# Patient Record
Sex: FEMALE | Race: WHITE | NOT HISPANIC OR LATINO | Employment: UNEMPLOYED | URBAN - METROPOLITAN AREA
[De-identification: names, ages, dates, MRNs, and addresses within clinical notes are randomized per-mention and may not be internally consistent; named-entity substitution may affect disease eponyms.]

---
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
## 2024-10-05 ENCOUNTER — TRANSFERRED RECORDS (OUTPATIENT)
Dept: HEALTH INFORMATION MANAGEMENT | Facility: CLINIC | Age: 19
End: 2024-10-05
Payer: COMMERCIAL

## 2024-10-08 ENCOUNTER — OFFICE VISIT (OUTPATIENT)
Dept: PEDIATRICS | Facility: CLINIC | Age: 19
End: 2024-10-08
Payer: COMMERCIAL

## 2024-10-08 ENCOUNTER — PATIENT OUTREACH (OUTPATIENT)
Dept: CARE COORDINATION | Facility: CLINIC | Age: 19
End: 2024-10-08

## 2024-10-08 VITALS
BODY MASS INDEX: 26.63 KG/M2 | WEIGHT: 156 LBS | OXYGEN SATURATION: 98 % | DIASTOLIC BLOOD PRESSURE: 66 MMHG | TEMPERATURE: 98.6 F | HEIGHT: 64 IN | SYSTOLIC BLOOD PRESSURE: 119 MMHG | RESPIRATION RATE: 16 BRPM | HEART RATE: 72 BPM
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
## 2024-10-08 DIAGNOSIS — Z13.9 ENCOUNTER FOR SCREENING INVOLVING SOCIAL DETERMINANTS OF HEALTH (SDOH): ICD-10-CM

## 2024-10-08 PROCEDURE — 99204 OFFICE O/P NEW MOD 45 MIN: CPT | Performed by: NURSE PRACTITIONER

## 2024-10-08 PROCEDURE — G2211 COMPLEX E/M VISIT ADD ON: HCPCS | Performed by: NURSE PRACTITIONER

## 2024-10-08 RX ORDER — OXYCODONE HYDROCHLORIDE 5 MG/1
5-10 TABLET ORAL EVERY 6 HOURS PRN
Qty: 18 TABLET | Refills: 0 | Status: SHIPPED | OUTPATIENT
Start: 2024-10-08 | End: 2024-10-11

## 2024-10-08 RX ORDER — ONDANSETRON 4 MG/1
4 TABLET, ORALLY DISINTEGRATING ORAL EVERY 8 HOURS PRN
Qty: 20 TABLET | Refills: 3 | Status: SHIPPED | OUTPATIENT
Start: 2024-10-08

## 2024-10-08 RX ORDER — POLYETHYLENE GLYCOL 3350 17 G/17G
1 POWDER, FOR SOLUTION ORAL DAILY
Qty: 510 G | Refills: 0 | Status: SHIPPED | OUTPATIENT
Start: 2024-10-08

## 2024-10-08 ASSESSMENT — PAIN SCALES - GENERAL: PAINLEVEL: WORST PAIN (10)
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
## 2024-10-08 NOTE — LETTER
[facility] CARE COORDINATION      October 8, 2024    Christina Quintana  [address]  [place] [place] [facility] [address]      Dear Christina,    I am a clinic care coordinator who works with [facility] with [facility]. I wanted to thank you for spending the time to talk with me.  Below is a description of clinic care coordination and how I can further assist you.       The clinic care coordination team is made up of a registered nurse, , financial resource worker and community health worker who understand the health care system. The goal of clinic care coordination is to help you manage your health and improve access to the health care system. Our team works alongside your provider to assist you in determining your health and social needs. We can help you obtain health care and community resources, providing you with necessary information and education. We can work with you through any barriers and develop a care plan that helps coordinate and strengthen the communication between you and your care team.  Our services are voluntary and are offered without charge to you personally.    Please feel free to contact me with any questions or concerns regarding care coordination and what we can offer.      We are focused on providing you with the highest-quality healthcare experience possible.    Sincerely,     Daryl Mendoza [facility]  Clinic Care Coordinator  [facility]  [phone]  [email or web address]                  Resources Discussed:     Domestic Violence Resources  Day One Crisis  Crisis Hotline:1.097.597.0219  Crisis TEXT line: 567.765.5035  Http://dayoneservices.org/      [facility]  Business Line: [phone]  Business Line: [phone]  Business Line: (587) [phone]/TTY  Crisis Line: (114) 923-7023  www.360communities.org       [place]  "Neighborhood Services  Business Line: [phone]  www.Bioincept.org      [place]  Business Line: [phone]  Crisis Line: (220) 986-8223  www.[employer].org      Women's Advocates  Business Line: [phone]) [phone]  Crisis Line: ([phone]  www.[employer].MyPar[employer]ay       Women of Nations  Business Line: ([phone]) [phone]  Crisis Line: (815) 829-8247  women-of-nations.org        Financial Resources:  -[employer]: \"You must email your request to this address: [email or web address]   If you do not have access to email, you can text us at  [phone]. Do not mail requests.  Under no circumstances should you call in to request assistance. We will only except email requests or texts. This is for your safety, so you can see all documentation concerning your request.  You must be a resident of [place].\"  https://www.ume07-uytqtgilkxbofm.org/    -[employer]: Offers one-time assistance for specific needs, typically smaller amounts. May help with a security deposit. https://popmn.org/mission/mission-outpost/    -[address] [place] [address]:  [facility] Resource [address], [address], [place] [address]  Phone: (220) [phone]  Https://We Tribute.org/contact/  https://stage2.[address] [place] [address].org/resources/family-resource-centers/    -[facility] Emergency Assistance: \"Emergency assistance programs are short-term assistance (usually a one-time payment) for people experiencing a financial hardship such as an eviction or utility shutoff. Any income not needed for basic needs will be considered available to meet the emergency as well as any cash or other assets you might have.\"  Https://www.co.[place]./HealthFamily/PublicAssistance/Emergency/Pages/default.aspx      Therapy Options: (request someone who specializes in trauma)  [facility]   [phone]   https://[facility].[employer]gongfang/  Provider Bios: https://[employer]/locations/[facility]/    [facility]: [place]  [phone]  "   https://www.[facility].com/  Provider Bios: https://www.[facility]nergys.com/martín-staff    [facility]: [place]-  [phone]   https://[facility].com/  Provider Bios: https://[employer].com/providers/    [facility]-  [phone]  https://www.[facility].com/  Provider Bios: https://www."[employer].com/meet-our-team/fgussgxki-ji-ecvhijvaak     [place] [facility] [place]-   [phone]  https://[facility].com/  Provider Bios: https://[facility].com/our-professionals/     [facility]: [place]-  [phone]  https://www.What's Trending.com/our-locations/[place]/[facility]-cl[facility]/  Provider Bios: https://www.[employer]ing.com/our-providers/     [facility]:-[place]-  [phone]  https://[facility].com/  Provider Bios: https://[facility]Smn.com/[facility]/

## 2024-10-08 NOTE — PATIENT INSTRUCTIONS
Tylenol: Maximum 3000mg in a day. Take tylenol 3x per day (1000mg)    Ibuprofen: 400-800mg. Take with food
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
## 2024-10-10 ENCOUNTER — OFFICE VISIT (OUTPATIENT)
Dept: PEDIATRICS | Facility: CLINIC | Age: 19
End: 2024-10-10
Payer: COMMERCIAL

## 2024-10-10 ENCOUNTER — TELEPHONE (OUTPATIENT)
Dept: PEDIATRICS | Facility: CLINIC | Age: 19
End: 2024-10-10

## 2024-10-10 VITALS
TEMPERATURE: 99.3 F | WEIGHT: 153.2 LBS | HEART RATE: 105 BPM | HEIGHT: 64 IN | DIASTOLIC BLOOD PRESSURE: 68 MMHG | RESPIRATION RATE: 18 BRPM | BODY MASS INDEX: 26.15 KG/M2 | OXYGEN SATURATION: 98 % | SYSTOLIC BLOOD PRESSURE: 114 MMHG

## 2024-10-10 DIAGNOSIS — R79.89 ABNORMAL CBC: ICD-10-CM
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
## 2024-10-10 LAB
BASOPHILS # BLD AUTO: 0 10E3/UL (ref 0–0.2)
BASOPHILS NFR BLD AUTO: 0 %
EOSINOPHIL # BLD AUTO: 0.2 10E3/UL (ref 0–0.7)
EOSINOPHIL NFR BLD AUTO: 2 %
ERYTHROCYTE [DISTWIDTH] IN BLOOD BY AUTOMATED COUNT: 13.6 % (ref 10–15)
HCT VFR BLD AUTO: 39.8 % (ref 35–47)
HGB BLD-MCNC: 13.1 G/DL (ref 11.7–15.7)
IMM GRANULOCYTES # BLD: 0 10E3/UL
IMM GRANULOCYTES NFR BLD: 0 %
LYMPHOCYTES # BLD AUTO: 2 10E3/UL (ref 0.8–5.3)
LYMPHOCYTES NFR BLD AUTO: 29 %
MCH RBC QN AUTO: 29.9 PG (ref 26.5–33)
MCHC RBC AUTO-ENTMCNC: 32.9 G/DL (ref 31.5–36.5)
MCV RBC AUTO: 91 FL (ref 78–100)
MONOCYTES # BLD AUTO: 0.5 10E3/UL (ref 0–1.3)
MONOCYTES NFR BLD AUTO: 7 %
NEUTROPHILS # BLD AUTO: 4.2 10E3/UL (ref 1.6–8.3)
NEUTROPHILS NFR BLD AUTO: 61 %
PLATELET # BLD AUTO: 222 10E3/UL (ref 150–450)
RBC # BLD AUTO: 4.38 10E6/UL (ref 3.8–5.2)
WBC # BLD AUTO: 6.8 10E3/UL (ref 4–11)

## 2024-10-10 PROCEDURE — G2211 COMPLEX E/M VISIT ADD ON: HCPCS | Performed by: NURSE PRACTITIONER

## 2024-10-10 PROCEDURE — 99204 OFFICE O/P NEW MOD 45 MIN: CPT | Performed by: NURSE PRACTITIONER

## 2024-10-10 PROCEDURE — 36415 COLL VENOUS BLD VENIPUNCTURE: CPT | Performed by: NURSE PRACTITIONER

## 2024-10-10 PROCEDURE — 85025 COMPLETE CBC W/AUTO DIFF WBC: CPT | Performed by: NURSE PRACTITIONER

## 2024-10-10 ASSESSMENT — PAIN SCALES - GENERAL: PAINLEVEL: SEVERE PAIN (6)
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
## 2024-10-10 NOTE — TELEPHONE ENCOUNTER
Call pt  Is she interested in seeing OBGYN to discuss birth control options like IUD? If so, I can put in a referral.  Kasandra Goodman, RALEIGH, CNP

## 2024-10-10 NOTE — PATIENT INSTRUCTIONS
Keep up with the daily dressing changes  We will recheck labs today  Referral to wound clinic: you will get a call to schedule

## 2024-10-10 NOTE — TELEPHONE ENCOUNTER
Called and spoke to patient. Advised of provider message. Patient declined referral at this time.  Janet EWING RN, BSN  Clinic RN  [facility]

## 2024-10-10 NOTE — LETTER
October 11, 2024      Christina Quintana  [address] [place] [facility] [address]        Dear ,    We are writing to inform you of your test results.    Your test results fall within the expected range(s) or remain unchanged from previous results.  Please continue with current treatment plan.    Resulted Orders   CBC with platelets and differential   Result Value Ref Range    WBC Count 6.8 4.0 - 11.0 10e3/uL    RBC Count 4.38 3.80 - 5.20 10e6/uL    Hemoglobin 13.1 11.7 - 15.7 g/dL    Hematocrit 39.8 35.0 - 47.0 %    MCV 91 78 - 100 fL    MCH 29.9 26.5 - 33.0 pg    MCHC 32.9 31.5 - 36.5 g/dL    RDW 13.6 10.0 - 15.0 %    Platelet Count 222 150 - 450 10e3/uL    % Neutrophils 61 %    % Lymphocytes 29 %    % Monocytes 7 %    % Eosinophils 2 %    % Basophils 0 %    % Immature Granulocytes 0 %    Absolute Neutrophils 4.2 1.6 - 8.3 10e3/uL    Absolute Lymphocytes 2.0 0.8 - 5.3 10e3/uL    Absolute Monocytes 0.5 0.0 - 1.3 10e3/uL    Absolute Eosinophils 0.2 0.0 - 0.7 10e3/uL    Absolute Basophils 0.0 0.0 - 0.2 10e3/uL    Absolute Immature Granulocytes 0.0 <=0.4 10e3/uL       If you have any questions or concerns, please call the clinic at the number listed above.       Sincerely,      Kasandra Goodman NP

## 2024-10-11 ENCOUNTER — PATIENT OUTREACH (OUTPATIENT)
Dept: CARE COORDINATION | Facility: CLINIC | Age: 19
End: 2024-10-11
Payer: COMMERCIAL

## 2024-10-11 NOTE — PROGRESS NOTES
Clinic Care Coordination Contact  [facility]/Voicemail    Clinical Data: Care Coordinator Outreach    Outreach Documentation Number of Outreach Attempt   10/11/2024  10:35 AM 1       Left message on patient's voicemail with call back information and requested return call.    Plan: Care Coordinator will try to reach patient again in 3-5 business days.    Daryl Mendoza [facility]  Clinic Care Coordinator  [facility]  [phone]  [email or web address]

## 2024-10-14 ENCOUNTER — TELEPHONE (OUTPATIENT)
Dept: WOUND CARE | Facility: CLINIC | Age: 19
End: 2024-10-14
Payer: COMMERCIAL

## 2024-10-14 NOTE — TELEPHONE ENCOUNTER
Consult received via Workqueue from Kasandra Goodman NP for wound of the Left Leg    Does the patient have an open and draining wound? Yes    Please schedule with Pauly Carter PA-C, Kitty Lunsford NP, or Sheldon Hay M.D. at [facility] [place] for next available appointment.    **For all providers, please schedule a follow up 4 weeks after initial appointment. (2-3 weeks for Dr. Sethi and Dr. Nixon)**  --If unable to schedule within 2-3 weeks then please place on cancellation list--    Is the patient able to make their own medical decisions? Yes    Can the patient be scheduled on a Thursday ([place] (starting in November))? Yes    Is patient a CHRISTIANO lift? PLEASE INQUIRE WHEN MAKING THE APPOINTMENT AND PUT IN APPOINTMENT NOTES    Routing to  Wound Healing Scheduling.

## 2024-10-14 NOTE — TELEPHONE ENCOUNTER
Patient called would like to be scheduled to be seen for a left inner thigh wound, states there is a hole in the back and front of her thigh.    Christina [phone]

## 2024-10-16 ENCOUNTER — PATIENT OUTREACH (OUTPATIENT)
Dept: CARE COORDINATION | Facility: CLINIC | Age: 19
End: 2024-10-16
Payer: COMMERCIAL
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
## 2024-10-17 ENCOUNTER — HOSPITAL ENCOUNTER (OUTPATIENT)
Dept: WOUND CARE | Facility: CLINIC | Age: 19
Discharge: HOME | End: 2024-10-17
Payer: COMMERCIAL

## 2024-10-17 VITALS
HEIGHT: 64 IN | WEIGHT: 154.8 LBS | BODY MASS INDEX: 26.43 KG/M2 | HEART RATE: 69 BPM | DIASTOLIC BLOOD PRESSURE: 77 MMHG | SYSTOLIC BLOOD PRESSURE: 118 MMHG | TEMPERATURE: 96.4 F

## 2024-10-17 DIAGNOSIS — L97.122: Primary | ICD-10-CM

## 2024-10-17 PROCEDURE — 97602 WOUND(S) CARE NON-SELECTIVE: CPT

## 2024-10-17 PROCEDURE — 99204 OFFICE O/P NEW MOD 45 MIN: CPT

## 2024-10-17 PROCEDURE — G0463 HOSPITAL OUTPT CLINIC VISIT: HCPCS | Mod: 25

## 2024-10-17 NOTE — DISCHARGE INSTRUCTIONS
"10/17/2024   Christina Quintana   2005    A DME order for supplies has been placed to [employer]. If there are any issues with your order including not receiving the order please call [employer] at [phone]. They can also provide a tracking number for you.    Dressing changes outside of clinic are being performed by Patient    Plan 10/17/2024   -You may have a seroma or hematoma near the wound and that is why the tissue feels firm. This will resolve over time and will reabsorb into your tissue.   -Okay to shower when you are ready. Take off the dressing. Let the water run over the wound. Pat dry. And apply the dressings listed below.     Wound Dressing Change: Left medial thigh   -Wash your hands with soap and water before you begin your dressing change and prepare a clean surface for dressings.  -Cleanse with mild unscented soap and water (such as Cetaphil, Cerave or Dove)   -Primary dressing: Apply a very thin layer of Triad paste to the wound bed. If the wound bed is red and healthy with no yellow tissue you can omit this step.   -Secondary dressing: Apply 1/15th piece of 4x4 Fibrocol over the triad paste  -Cover with 2 4x4 gauze pad   -secure with 1 4\" conforming roll gauze and medipore tape  -Apply 6\" Ace Wrap  -Change daily     Wound Dressing Change: left posterior thigh  -Wash your hands with soap and water before you begin your dressing change and prepare a clean surface for dressings.  -Cleanse with mild unscented soap and water (such as Cetaphil, Cerave or Dove)   -Primary dressing: Apply 1/15th piece of 4x4 Fibrocol to the wound bed. (Fibrocol may dissolve into the wound bed so you may not see it at your next dressing change)  -Secondary dressing: Apply 2 4x4 gauze pad  -secure with 1 4\" conforming roll gauze and medipore tape  -Apply 6\" Ace Wrap   -Change daily     A diet high in protein is important for wound healing, we recommend getting 90 grams of protein per day. Taking protein shakes or " bars are a good way to get extra protein in your diet.   Good sources of protein:  Pork 26g per 3 oz  Whey protein powder - 24g per scoop (on average)  Greek yogurt - 23g per 8oz   Chicken or Turkey - 23g per 3oz  Fish - 20-25g per 3oz  Beef - 18-23g per 3oz  Tofu - 10g per 1/2 cup  Navy beans - 20g per cup  Cottage cheese - 14g per 1/2 cup   Lentils - 13g per 1/4 cup  Beef jerky 13g per 1oz  2% milk - 8g per cup  Peanut butter - 8g per 2 tablespoons  Eggs - 6g per egg  Mixed nuts - 6g per 2oz       Main Provider: Kitty Lunsford NP October 17, 2024    Call us at [phone] if you have any questions about your wounds, if you have redness or swelling around your wound, have a fever of 101 degrees Fahrenheit or greater or if you have any other problems or concerns. We answer the phone Monday through Friday 8 am to 4 pm, please leave a message as we check the voicemail frequently throughout the day. If you have a concern over the weekend, please leave a message and we will return your call Monday. If the need is urgent, go to the ER or urgent care.    If you had a positive experience please indicate that on your patient satisfaction survey form that [facility] will be sending you.    It was a pleasure meeting with you today.  Thank you for allowing me and my team the privilege of caring for you today.  YOU are the reason we are here, and I truly hope we provided you with the excellent service you deserve.  Please let us know if there is anything else we can do for you so that we can be sure you are leaving completely satisfied with your care experience.      If you have any billing related questions please call the [facility] Business office at [phone]. The clinic staff does not handle billing related matters.    If you are scheduled to have a follow up appointment, you will receive a reminder call the day before your visit. On the appointment day please arrive 15 minutes prior to your appointment time. If  you are unable to keep that appointment, please call the clinic to cancel or reschedule. If you are more than 10 minutes late or greater for your scheduled appointment time, the clinic policy is that you may be asked to reschedule.

## 2024-10-17 NOTE — PROGRESS NOTES
Patient arrived for wound care visit. Certified Wound Care Nurse time spent evaluating patient record, completed a full evaluation and documented wound(s) & amy-wound skin; provided recommendation based on treatment plan. Applied dressing, reviewed discharge instructions, patient education, and discussed plan of care with appropriate medical team staff members and patient and/or family members.
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
## 2024-10-22 ENCOUNTER — HOSPITAL ENCOUNTER (EMERGENCY)
Facility: CLINIC | Age: 19
Discharge: LEFT BEFORE DISCHARGE | End: 2024-10-22
Admitting: EMERGENCY MEDICINE
Payer: COMMERCIAL

## 2024-10-22 VITALS
SYSTOLIC BLOOD PRESSURE: 137 MMHG | DIASTOLIC BLOOD PRESSURE: 81 MMHG | RESPIRATION RATE: 18 BRPM | TEMPERATURE: 98.3 F | OXYGEN SATURATION: 98 % | HEIGHT: 63 IN | HEART RATE: 78 BPM | BODY MASS INDEX: 27.46 KG/M2 | WEIGHT: 155 LBS

## 2024-10-22 PROCEDURE — 99281 EMR DPT VST MAYX REQ PHY/QHP: CPT

## 2024-10-22 RX ORDER — OXYCODONE HYDROCHLORIDE 5 MG/1
5 CAPSULE ORAL EVERY 4 HOURS PRN
COMMUNITY

## 2024-10-22 ASSESSMENT — ACTIVITIES OF DAILY LIVING (ADL)
ADLS_ACUITY_SCORE: 35
ADLS_ACUITY_SCORE: 35
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
## 2024-10-23 NOTE — ED NOTES
Patient upset with wait time & not willing to wait to be seen.  Left per pedis with female person.  Gait steady.

## 2025-01-12 ENCOUNTER — HOSPITAL ENCOUNTER (EMERGENCY)
Facility: CLINIC | Age: 20
Discharge: HOME | End: 2025-01-12
Attending: EMERGENCY MEDICINE | Admitting: EMERGENCY MEDICINE
Payer: COMMERCIAL

## 2025-01-12 VITALS
WEIGHT: 154.98 LBS | BODY MASS INDEX: 27.45 KG/M2 | OXYGEN SATURATION: 100 % | SYSTOLIC BLOOD PRESSURE: 132 MMHG | TEMPERATURE: 97.5 F | HEART RATE: 83 BPM | RESPIRATION RATE: 16 BRPM | DIASTOLIC BLOOD PRESSURE: 77 MMHG

## 2025-01-12 DIAGNOSIS — N12 PYELONEPHRITIS OF RIGHT KIDNEY: ICD-10-CM

## 2025-01-12 LAB
ALBUMIN UR-MCNC: 10 MG/DL
ANION GAP SERPL CALCULATED.3IONS-SCNC: 15 MMOL/L (ref 7–15)
APPEARANCE UR: ABNORMAL
BASOPHILS # BLD AUTO: 0 10E3/UL (ref 0–0.2)
BASOPHILS NFR BLD AUTO: 0 %
BILIRUB UR QL STRIP: NEGATIVE
BUN SERPL-MCNC: 7.8 MG/DL (ref 6–20)
CALCIUM SERPL-MCNC: 9.2 MG/DL (ref 8.8–10.4)
CHLORIDE SERPL-SCNC: 102 MMOL/L (ref 98–107)
COLOR UR AUTO: YELLOW
CREAT SERPL-MCNC: 0.67 MG/DL (ref 0.51–0.95)
EGFRCR SERPLBLD CKD-EPI 2021: >90 ML/MIN/1.73M2
EOSINOPHIL # BLD AUTO: 0 10E3/UL (ref 0–0.7)
EOSINOPHIL NFR BLD AUTO: 0 %
ERYTHROCYTE [DISTWIDTH] IN BLOOD BY AUTOMATED COUNT: 13.1 % (ref 10–15)
GLUCOSE SERPL-MCNC: 82 MG/DL (ref 70–99)
GLUCOSE UR STRIP-MCNC: NEGATIVE MG/DL
HCO3 SERPL-SCNC: 21 MMOL/L (ref 22–29)
HCT VFR BLD AUTO: 38.2 % (ref 35–47)
HGB BLD-MCNC: 13.3 G/DL (ref 11.7–15.7)
HGB UR QL STRIP: NEGATIVE
HOLD SPECIMEN: NORMAL
HOLD SPECIMEN: NORMAL
IMM GRANULOCYTES # BLD: 0.1 10E3/UL
IMM GRANULOCYTES NFR BLD: 1 %
KETONES UR STRIP-MCNC: NEGATIVE MG/DL
LEUKOCYTE ESTERASE UR QL STRIP: ABNORMAL
LYMPHOCYTES # BLD AUTO: 3.6 10E3/UL (ref 0.8–5.3)
LYMPHOCYTES NFR BLD AUTO: 19 %
MCH RBC QN AUTO: 29.7 PG (ref 26.5–33)
MCHC RBC AUTO-ENTMCNC: 34.8 G/DL (ref 31.5–36.5)
MCV RBC AUTO: 85 FL (ref 78–100)
MONOCYTES # BLD AUTO: 1.7 10E3/UL (ref 0–1.3)
MONOCYTES NFR BLD AUTO: 9 %
MUCOUS THREADS #/AREA URNS LPF: PRESENT /LPF
NEUTROPHILS # BLD AUTO: 13.9 10E3/UL (ref 1.6–8.3)
NEUTROPHILS NFR BLD AUTO: 72 %
NITRATE UR QL: NEGATIVE
NRBC # BLD AUTO: 0 10E3/UL
NRBC BLD AUTO-RTO: 0 /100
PH UR STRIP: 6 [PH] (ref 5–7)
PLAT MORPH BLD: NORMAL
PLATELET # BLD AUTO: 276 10E3/UL (ref 150–450)
POTASSIUM SERPL-SCNC: 3.8 MMOL/L (ref 3.4–5.3)
RBC # BLD AUTO: 4.48 10E6/UL (ref 3.8–5.2)
RBC MORPH BLD: NORMAL
RBC URINE: 5 /HPF
SODIUM SERPL-SCNC: 138 MMOL/L (ref 135–145)
SP GR UR STRIP: 1.02 (ref 1–1.03)
SQUAMOUS EPITHELIAL: 11 /HPF
UROBILINOGEN UR STRIP-MCNC: 4 MG/DL
WBC # BLD AUTO: 19.4 10E3/UL (ref 4–11)
WBC URINE: 7 /HPF

## 2025-01-12 PROCEDURE — 96375 TX/PRO/DX INJ NEW DRUG ADDON: CPT

## 2025-01-12 PROCEDURE — 85025 COMPLETE CBC W/AUTO DIFF WBC: CPT | Performed by: EMERGENCY MEDICINE

## 2025-01-12 PROCEDURE — 81001 URINALYSIS AUTO W/SCOPE: CPT | Performed by: EMERGENCY MEDICINE

## 2025-01-12 PROCEDURE — 80048 BASIC METABOLIC PNL TOTAL CA: CPT | Performed by: EMERGENCY MEDICINE

## 2025-01-12 PROCEDURE — 85014 HEMATOCRIT: CPT | Performed by: EMERGENCY MEDICINE

## 2025-01-12 PROCEDURE — 36415 COLL VENOUS BLD VENIPUNCTURE: CPT | Performed by: EMERGENCY MEDICINE

## 2025-01-12 PROCEDURE — 87040 BLOOD CULTURE FOR BACTERIA: CPT | Performed by: EMERGENCY MEDICINE

## 2025-01-12 PROCEDURE — 99284 EMERGENCY DEPT VISIT MOD MDM: CPT | Mod: 25

## 2025-01-12 PROCEDURE — 250N000011 HC RX IP 250 OP 636: Performed by: EMERGENCY MEDICINE

## 2025-01-12 PROCEDURE — 96374 THER/PROPH/DIAG INJ IV PUSH: CPT

## 2025-01-12 RX ORDER — CEFTRIAXONE 2 G/1
2 INJECTION, POWDER, FOR SOLUTION INTRAMUSCULAR; INTRAVENOUS ONCE
Status: COMPLETED | OUTPATIENT
Start: 2025-01-12 | End: 2025-01-12

## 2025-01-12 RX ORDER — CEFPODOXIME PROXETIL 200 MG/1
200 TABLET, FILM COATED ORAL 2 TIMES DAILY
Qty: 20 TABLET | Refills: 0 | Status: SHIPPED | OUTPATIENT
Start: 2025-01-12 | End: 2025-01-22

## 2025-01-12 RX ORDER — KETOROLAC TROMETHAMINE 15 MG/ML
15 INJECTION, SOLUTION INTRAMUSCULAR; INTRAVENOUS ONCE
Status: COMPLETED | OUTPATIENT
Start: 2025-01-12 | End: 2025-01-12

## 2025-01-12 RX ADMIN — CEFTRIAXONE 2 G: 2 INJECTION, POWDER, FOR SOLUTION INTRAMUSCULAR; INTRAVENOUS at 18:46

## 2025-01-12 RX ADMIN — KETOROLAC TROMETHAMINE 15 MG: 15 INJECTION, SOLUTION INTRAMUSCULAR; INTRAVENOUS at 18:40

## 2025-01-12 ASSESSMENT — COLUMBIA-SUICIDE SEVERITY RATING SCALE - C-SSRS
6. HAVE YOU EVER DONE ANYTHING, STARTED TO DO ANYTHING, OR PREPARED TO DO ANYTHING TO END YOUR LIFE?: NO
2. HAVE YOU ACTUALLY HAD ANY THOUGHTS OF KILLING YOURSELF IN THE PAST MONTH?: NO
1. IN THE PAST MONTH, HAVE YOU WISHED YOU WERE DEAD OR WISHED YOU COULD GO TO SLEEP AND NOT WAKE UP?: NO

## 2025-01-12 ASSESSMENT — ACTIVITIES OF DAILY LIVING (ADL)
ADLS_ACUITY_SCORE: 42
ADLS_ACUITY_SCORE: 42

## 2025-01-12 NOTE — ED TRIAGE NOTES
Right sided flank pain that started 3 days ago. Hx of pyelonephritis, feels similar to that. Denies pain with urination.

## 2025-01-13 NOTE — ED PROVIDER NOTES
Emergency Department Note      History of Present Illness     Chief Complaint   Flank Pain      HPI   Christina Quintana is a 19 year old female with a past medical history significant for several previous episodes of pyelonephritis, asthma, and obesity who presents to the emergency department for evaluation of right sided flank pain. She reports that starting about 2-3 days ago, she had the sudden onset of right lower back and flank pain. However, when the pain first came on, she thought that she had maybe slept wrong and it felt very musculoskeletal in nature. However, the following day she felt this pain starting to radiate to her right ribcage. She also endorses subjective fever, chills, and body aches at this time. She did have pyelonephritis about a year ago, and she feels that her symptoms at this time are consistent with what she was experiencing when she had this. She was started on antibiotics following this procedure, but is not sure the name of the medication. She was not admitted to the hospital during this event. She had pyelonephritis about 4.5 years ago as well and was admitted to [facility]. She denies any history of nephrolithiasis. She denies any urinary symptoms at this time, nausea, or episodes of emesis. She is afebrile at bedside with a temperature of 97.5 F. She does note that she often applies scented lotion to her genital area and believes this may be causing her recurrent pyelonephritis episodes. She took ibuprofen at about 1100 am this morning to manage the pain, without significant relief.    Independent Historian   None    Review of External Notes   I reviewed the ED to admission note from 9/8/20, for which the patient was admitted for pyelonephritis.    Past Medical History   Medical History and Problem List   Pyelonephritis  Obesity  Ulcer of left thigh  Asthma  chlamydia    Medications   Hydrocodone-acetaminophen  ondansetron  oxycodone   polyethylene glycol  Albuterol  Pulmicort  nebulizer    Physical Exam     Patient Vitals for the past 24 hrs:   BP Temp Temp src Pulse Resp SpO2 Weight   01/12/25 1721 132/77 -- -- -- -- -- --   01/12/25 1719 -- 97.5  F (36.4  C) Temporal 83 16 100 % 70.3 kg (154 lb 15.7 oz)     Physical Exam  Nursing note and vitals reviewed.  HENT:   Mouth/Throat: Moist mucous membranes.   Eyes: EOMI, nonicteric sclera  Cardiovascular: Normal rate, regular rhythm, no murmurs, rubs, or gallops  Pulmonary/Chest: Effort normal and breath sounds normal. No respiratory distress. No wheezes. No rales.   Abdominal: Soft. Nontender, nondistended, no guarding or rigidity.  Right-sided CVA tenderness.  Musculoskeletal: Normal range of motion.   Neurological: Alert. Moves all extremities spontaneously.   Skin: Skin is warm and dry. No rash noted.         Diagnostics     Lab Results   Labs Ordered and Resulted from Time of ED Arrival to Time of ED Departure   ROUTINE UA WITH MICROSCOPIC REFLEX TO CULTURE - Abnormal       Result Value    Color Urine Yellow      Appearance Urine Slightly Cloudy (*)     Glucose Urine Negative      Bilirubin Urine Negative      Ketones Urine Negative      Specific Gravity Urine 1.024      Blood Urine Negative      pH Urine 6.0      Protein Albumin Urine 10 (*)     Urobilinogen Urine 4.0 (*)     Nitrite Urine Negative      Leukocyte Esterase Urine Trace (*)     Mucus Urine Present (*)     RBC Urine 5 (*)     WBC Urine 7 (*)     Squamous Epithelials Urine 11 (*)    BASIC METABOLIC PANEL - Abnormal    Sodium 138      Potassium 3.8      Chloride 102      Carbon Dioxide (CO2) 21 (*)     Anion Gap 15      Urea Nitrogen 7.8      Creatinine 0.67      GFR Estimate >90      Calcium 9.2      Glucose 82     CBC WITH PLATELETS AND DIFFERENTIAL - Abnormal    WBC Count 19.4 (*)     RBC Count 4.48      Hemoglobin 13.3      Hematocrit 38.2      MCV 85      MCH 29.7      MCHC 34.8      RDW 13.1      Platelet Count 276      % Neutrophils 72      % Lymphocytes 19      %  Monocytes 9      % Eosinophils 0      % Basophils 0      % Immature Granulocytes 1      NRBCs per 100 WBC 0      Absolute Neutrophils 13.9 (*)     Absolute Lymphocytes 3.6      Absolute Monocytes 1.7 (*)     Absolute Eosinophils 0.0      Absolute Basophils 0.0      Absolute Immature Granulocytes 0.1      Absolute NRBCs 0.0     RBC AND PLATELET MORPHOLOGY    RBC Morphology Confirmed RBC Indices      Platelet Assessment        Value: Automated Count Confirmed. Platelet morphology is normal.   BLOOD CULTURE       Imaging   No orders to display       EKG   None    Independent Interpretation   None    ED Course      Medications Administered   Medications   cefTRIAXone (ROCEPHIN) 2 g vial to attach to  ml bag for ADULTS or NS 50 ml bag for PEDS (0 g Intravenous Stopped 1/12/25 1900)   ketorolac (TORADOL) injection 15 mg (15 mg Intravenous $Given 1/12/25 1840)       Procedures   Procedures     Discussion of Management   None    ED Course   ED Course as of 01/12/25 1909   Sun Jan 12, 2025 1821 I obtained history and examined the patient as noted above.         Additional Documentation  None    Medical Decision Making / Diagnosis     CMS Diagnoses:none    MIPS       None    [facility]   Christina Quintana is a 19 year old female who presents with right-sided flank pain.  Broad differential considered including pyelonephritis, kidney stone, musculoskeletal strain, shingles, among many other etiologies.  Vital signs normal.  Labs notable for leukocytosis.  Urinalysis not very suggestive of infection, but given patient's symptoms and history of similar, strongly suspect pyelonephritis.  Discussed CT imaging for rule out of kidney stone which patient declines.  Given degree of leukocytosis, I treated with dose of Rocephin.  Rx cefpodoxime sent to pharmacy.  Blood culture pending just in case of bacteremia, but I think patient is low risk for this.  No indication for hospitalization at this time with absence of severe overwhelming  infection.  She is in stable condition at the time of discharge, red flags that should merit ED return were discussed as well as recommended follow-up instructions. All questions were answered and she is in agreement with the plan.      Disposition   The patient was discharged.     Diagnosis     ICD-10-CM    1. Pyelonephritis of right kidney  N12            Discharge Medications   Discharge Medication List as of 1/12/2025  7:00 PM        START taking these medications    Details   cefpodoxime (VANTIN) 200 MG tablet Take 1 tablet (200 mg) by mouth 2 times daily for 10 days., Disp-20 tablet, R-0, E-Prescribe               Scribe Disclosure:  I, Naomie Medellin, am serving as a scribe at 6:32 PM on 1/12/2025 to document services personally performed by Edinson Crain MD based on my observations and the provider's statements to me.        Edinson Crain MD  01/14/25 [id number]

## 2025-01-16 LAB — BACTERIA BLD CULT: NORMAL
